# Patient Record
(demographics unavailable — no encounter records)

---

## 2024-12-19 NOTE — RISK ASSESSMENT
[Have you ever fainted, passed out or had an unexplained seizure suddenly and without warning, especially during exercise or in response] : Have you ever fainted, passed out or had an unexplained seizure suddenly and without warning, especially during exercise or in response to sudden loud noises such as doorbells, alarm clocks and ringing telephones? No [Have you ever had exercise-related chest pain or shortness of breath?] : Have you ever had exercise-related chest pain or shortness of breath? No [Are you related to anyone with hypertrophic cardiomyopathy or hypertrophic obstructive cardiomyopathy, Marfan syndrome, arrhythmogenic] : Are you related to anyone with hypertrophic cardiomyopathy or hypertrophic obstructive cardiomyopathy, Marfan syndrome, arrhythmogenic right ventricular cardiomyopathy, long QT syndrome, short QT syndrome, Brugada syndrome or catecholaminergic polymorphic ventricular tachycardia, or anyone younger than 50 years with a pacemaker or implantable defibrillator? No [No Increased risk of SCA or SCD] : No Increased risk of SCA or SCD    [Yes] : Patient consents to screening.

## 2024-12-19 NOTE — DISCUSSION/SUMMARY
[Normal Growth] : growth [Normal Development] : development  [No Elimination Concerns] : elimination [Continue Regimen] : feeding [No Skin Concerns] : skin [Normal Sleep Pattern] : sleep [None] : no medical problems [Anticipatory Guidance Given] : Anticipatory guidance addressed as per the history of present illness section [No Vaccines] : no vaccines needed [No Medications] : ~He/She~ is not on any medications [Patient] : patient [Parent/Guardian] : Parent/Guardian [FreeTextEntry1] : PHQ9 and CRAFFT screenings reviewed with plan of care developed in context of reporting and patient preference.   Options for routine blood work reviewed   Anticipatory Guidance for age, guided by screening responses   Limit Screen Time   Importance of healthy diet, sleep hygiene, Self-Care, and nutrition reviewed   Watch for and respond to anxiety, depression and perfectionism as needed.   Relationship Red Flags reviewed   Risks of drugs and driving reviewed   Coping Skills   Vaccines recommended per current CDC guidelines. Risks and benefits of vaccination as well as risk of not vaccinating in the timeframe recommended reviewed.  Risks of vaccines, benefits of vaccines, and risks of not vaccinating in the time frame recommended for patient and contacts reviewed. This includes all vaccines currently recommended (according to ACIP, AAP, and CDC). These recommendations are made regardless of whether or not they are required for school or day care. They are effective at reducing the risk of disability, hospitalization, and death. Vaccination not given due to refusal.  It is important for guardians and adult patients  to recognize the importance of conveying alternative immunization plans to all health care providers, especially in the context of acute illnesses. This knowledge may assist health care providers to develop individual care plans in context to vaccination delay.